# Patient Record
Sex: FEMALE | Race: WHITE | NOT HISPANIC OR LATINO | Employment: OTHER | URBAN - METROPOLITAN AREA
[De-identification: names, ages, dates, MRNs, and addresses within clinical notes are randomized per-mention and may not be internally consistent; named-entity substitution may affect disease eponyms.]

---

## 2024-02-03 ENCOUNTER — APPOINTMENT (OUTPATIENT)
Dept: RADIOLOGY | Facility: HOSPITAL | Age: 59
End: 2024-02-03
Payer: COMMERCIAL

## 2024-02-03 ENCOUNTER — HOSPITAL ENCOUNTER (OUTPATIENT)
Facility: HOSPITAL | Age: 59
Setting detail: OBSERVATION
Discharge: HOME | End: 2024-02-03
Attending: STUDENT IN AN ORGANIZED HEALTH CARE EDUCATION/TRAINING PROGRAM
Payer: COMMERCIAL

## 2024-02-03 ENCOUNTER — APPOINTMENT (OUTPATIENT)
Dept: CARDIOLOGY | Facility: HOSPITAL | Age: 59
End: 2024-02-03
Payer: COMMERCIAL

## 2024-02-03 VITALS
BODY MASS INDEX: 34.37 KG/M2 | SYSTOLIC BLOOD PRESSURE: 134 MMHG | RESPIRATION RATE: 12 BRPM | OXYGEN SATURATION: 89 % | HEART RATE: 90 BPM | TEMPERATURE: 97.2 F | HEIGHT: 66 IN | WEIGHT: 213.85 LBS | DIASTOLIC BLOOD PRESSURE: 81 MMHG

## 2024-02-03 DIAGNOSIS — R10.9 FLANK PAIN: Primary | ICD-10-CM

## 2024-02-03 DIAGNOSIS — N20.0 KIDNEY STONE: ICD-10-CM

## 2024-02-03 LAB
ALBUMIN SERPL BCP-MCNC: 4.4 G/DL (ref 3.4–5)
ALP SERPL-CCNC: 89 U/L (ref 33–136)
ALT SERPL W P-5'-P-CCNC: 28 U/L (ref 7–45)
ANION GAP SERPL CALC-SCNC: 17 MMOL/L (ref 10–20)
APPEARANCE UR: ABNORMAL
AST SERPL W P-5'-P-CCNC: 19 U/L (ref 9–39)
BASOPHILS # BLD AUTO: 0.05 X10*3/UL (ref 0–0.1)
BASOPHILS NFR BLD AUTO: 0.6 %
BILIRUB SERPL-MCNC: 0.4 MG/DL (ref 0–1.2)
BILIRUB UR STRIP.AUTO-MCNC: NEGATIVE MG/DL
BUN SERPL-MCNC: 16 MG/DL (ref 6–23)
CALCIUM SERPL-MCNC: 9.4 MG/DL (ref 8.6–10.3)
CHLORIDE SERPL-SCNC: 105 MMOL/L (ref 98–107)
CO2 SERPL-SCNC: 20 MMOL/L (ref 21–32)
COLOR UR: ABNORMAL
CREAT SERPL-MCNC: 0.69 MG/DL (ref 0.5–1.05)
EGFRCR SERPLBLD CKD-EPI 2021: >90 ML/MIN/1.73M*2
EOSINOPHIL # BLD AUTO: 0.16 X10*3/UL (ref 0–0.7)
EOSINOPHIL NFR BLD AUTO: 2.1 %
ERYTHROCYTE [DISTWIDTH] IN BLOOD BY AUTOMATED COUNT: 11.9 % (ref 11.5–14.5)
GLUCOSE SERPL-MCNC: 115 MG/DL (ref 74–99)
GLUCOSE UR STRIP.AUTO-MCNC: NEGATIVE MG/DL
HCT VFR BLD AUTO: 38.3 % (ref 36–46)
HGB BLD-MCNC: 13.3 G/DL (ref 12–16)
IMM GRANULOCYTES # BLD AUTO: 0.02 X10*3/UL (ref 0–0.7)
IMM GRANULOCYTES NFR BLD AUTO: 0.3 % (ref 0–0.9)
KETONES UR STRIP.AUTO-MCNC: NEGATIVE MG/DL
LEUKOCYTE ESTERASE UR QL STRIP.AUTO: ABNORMAL
LIPASE SERPL-CCNC: 44 U/L (ref 9–82)
LYMPHOCYTES # BLD AUTO: 2.87 X10*3/UL (ref 1.2–4.8)
LYMPHOCYTES NFR BLD AUTO: 37 %
MCH RBC QN AUTO: 31.7 PG (ref 26–34)
MCHC RBC AUTO-ENTMCNC: 34.7 G/DL (ref 32–36)
MCV RBC AUTO: 91 FL (ref 80–100)
MONOCYTES # BLD AUTO: 0.43 X10*3/UL (ref 0.1–1)
MONOCYTES NFR BLD AUTO: 5.5 %
MUCOUS THREADS #/AREA URNS AUTO: ABNORMAL /LPF
NEUTROPHILS # BLD AUTO: 4.23 X10*3/UL (ref 1.2–7.7)
NEUTROPHILS NFR BLD AUTO: 54.5 %
NITRITE UR QL STRIP.AUTO: NEGATIVE
NRBC BLD-RTO: 0 /100 WBCS (ref 0–0)
PH UR STRIP.AUTO: 7 [PH]
PLATELET # BLD AUTO: 293 X10*3/UL (ref 150–450)
POTASSIUM SERPL-SCNC: 3.6 MMOL/L (ref 3.5–5.3)
PROT SERPL-MCNC: 7.3 G/DL (ref 6.4–8.2)
PROT UR STRIP.AUTO-MCNC: NEGATIVE MG/DL
RBC # BLD AUTO: 4.2 X10*6/UL (ref 4–5.2)
RBC # UR STRIP.AUTO: ABNORMAL /UL
RBC #/AREA URNS AUTO: >20 /HPF
SODIUM SERPL-SCNC: 138 MMOL/L (ref 136–145)
SP GR UR STRIP.AUTO: 1.01
SQUAMOUS #/AREA URNS AUTO: ABNORMAL /HPF
UROBILINOGEN UR STRIP.AUTO-MCNC: <2 MG/DL
WBC # BLD AUTO: 7.8 X10*3/UL (ref 4.4–11.3)
WBC #/AREA URNS AUTO: ABNORMAL /HPF

## 2024-02-03 PROCEDURE — G0378 HOSPITAL OBSERVATION PER HR: HCPCS

## 2024-02-03 PROCEDURE — 83690 ASSAY OF LIPASE: CPT | Performed by: STUDENT IN AN ORGANIZED HEALTH CARE EDUCATION/TRAINING PROGRAM

## 2024-02-03 PROCEDURE — 99285 EMERGENCY DEPT VISIT HI MDM: CPT | Mod: 25 | Performed by: STUDENT IN AN ORGANIZED HEALTH CARE EDUCATION/TRAINING PROGRAM

## 2024-02-03 PROCEDURE — 87086 URINE CULTURE/COLONY COUNT: CPT

## 2024-02-03 PROCEDURE — 2550000001 HC RX 255 CONTRASTS: Performed by: STUDENT IN AN ORGANIZED HEALTH CARE EDUCATION/TRAINING PROGRAM

## 2024-02-03 PROCEDURE — 80053 COMPREHEN METABOLIC PANEL: CPT | Performed by: STUDENT IN AN ORGANIZED HEALTH CARE EDUCATION/TRAINING PROGRAM

## 2024-02-03 PROCEDURE — 96361 HYDRATE IV INFUSION ADD-ON: CPT | Performed by: STUDENT IN AN ORGANIZED HEALTH CARE EDUCATION/TRAINING PROGRAM

## 2024-02-03 PROCEDURE — 96375 TX/PRO/DX INJ NEW DRUG ADDON: CPT | Performed by: STUDENT IN AN ORGANIZED HEALTH CARE EDUCATION/TRAINING PROGRAM

## 2024-02-03 PROCEDURE — 2500000004 HC RX 250 GENERAL PHARMACY W/ HCPCS (ALT 636 FOR OP/ED)

## 2024-02-03 PROCEDURE — 99222 1ST HOSP IP/OBS MODERATE 55: CPT

## 2024-02-03 PROCEDURE — 36415 COLL VENOUS BLD VENIPUNCTURE: CPT | Performed by: STUDENT IN AN ORGANIZED HEALTH CARE EDUCATION/TRAINING PROGRAM

## 2024-02-03 PROCEDURE — 96365 THER/PROPH/DIAG IV INF INIT: CPT | Performed by: STUDENT IN AN ORGANIZED HEALTH CARE EDUCATION/TRAINING PROGRAM

## 2024-02-03 PROCEDURE — 81001 URINALYSIS AUTO W/SCOPE: CPT | Performed by: STUDENT IN AN ORGANIZED HEALTH CARE EDUCATION/TRAINING PROGRAM

## 2024-02-03 PROCEDURE — 74177 CT ABD & PELVIS W/CONTRAST: CPT | Performed by: RADIOLOGY

## 2024-02-03 PROCEDURE — 74018 RADEX ABDOMEN 1 VIEW: CPT

## 2024-02-03 PROCEDURE — 74177 CT ABD & PELVIS W/CONTRAST: CPT

## 2024-02-03 PROCEDURE — 93005 ELECTROCARDIOGRAM TRACING: CPT

## 2024-02-03 PROCEDURE — 85025 COMPLETE CBC W/AUTO DIFF WBC: CPT | Performed by: STUDENT IN AN ORGANIZED HEALTH CARE EDUCATION/TRAINING PROGRAM

## 2024-02-03 PROCEDURE — 2500000004 HC RX 250 GENERAL PHARMACY W/ HCPCS (ALT 636 FOR OP/ED): Performed by: STUDENT IN AN ORGANIZED HEALTH CARE EDUCATION/TRAINING PROGRAM

## 2024-02-03 RX ORDER — HEPARIN SODIUM 5000 [USP'U]/ML
5000 INJECTION, SOLUTION INTRAVENOUS; SUBCUTANEOUS EVERY 8 HOURS
Status: DISCONTINUED | OUTPATIENT
Start: 2024-02-03 | End: 2024-02-03 | Stop reason: HOSPADM

## 2024-02-03 RX ORDER — KETOROLAC TROMETHAMINE 30 MG/ML
15 INJECTION, SOLUTION INTRAMUSCULAR; INTRAVENOUS ONCE
Status: COMPLETED | OUTPATIENT
Start: 2024-02-03 | End: 2024-02-03

## 2024-02-03 RX ORDER — MORPHINE SULFATE 4 MG/ML
4 INJECTION, SOLUTION INTRAMUSCULAR; INTRAVENOUS ONCE
Status: DISCONTINUED | OUTPATIENT
Start: 2024-02-03 | End: 2024-02-03

## 2024-02-03 RX ORDER — ALBUTEROL SULFATE 90 UG/1
1-2 AEROSOL, METERED RESPIRATORY (INHALATION) EVERY 6 HOURS PRN
COMMUNITY
Start: 2023-11-29

## 2024-02-03 RX ORDER — ONDANSETRON HYDROCHLORIDE 2 MG/ML
4 INJECTION, SOLUTION INTRAVENOUS ONCE
Status: COMPLETED | OUTPATIENT
Start: 2024-02-03 | End: 2024-02-03

## 2024-02-03 RX ORDER — ONDANSETRON HYDROCHLORIDE 2 MG/ML
4 INJECTION, SOLUTION INTRAVENOUS EVERY 4 HOURS PRN
Status: DISCONTINUED | OUTPATIENT
Start: 2024-02-03 | End: 2024-02-03 | Stop reason: HOSPADM

## 2024-02-03 RX ORDER — AMLODIPINE BESYLATE 5 MG/1
5 TABLET ORAL DAILY
COMMUNITY
Start: 2023-11-02

## 2024-02-03 RX ORDER — BISMUTH SUBSALICYLATE 262 MG
1 TABLET,CHEWABLE ORAL DAILY
COMMUNITY

## 2024-02-03 RX ORDER — CEFTRIAXONE 1 G/50ML
1 INJECTION, SOLUTION INTRAVENOUS EVERY 24 HOURS
Status: DISCONTINUED | OUTPATIENT
Start: 2024-02-03 | End: 2024-02-03 | Stop reason: HOSPADM

## 2024-02-03 RX ORDER — SODIUM CHLORIDE, SODIUM LACTATE, POTASSIUM CHLORIDE, CALCIUM CHLORIDE 600; 310; 30; 20 MG/100ML; MG/100ML; MG/100ML; MG/100ML
100 INJECTION, SOLUTION INTRAVENOUS CONTINUOUS
Status: ACTIVE | OUTPATIENT
Start: 2024-02-03 | End: 2024-02-03

## 2024-02-03 RX ORDER — ACETAMINOPHEN 325 MG/1
650 TABLET ORAL EVERY 4 HOURS PRN
Status: DISCONTINUED | OUTPATIENT
Start: 2024-02-03 | End: 2024-02-03 | Stop reason: HOSPADM

## 2024-02-03 RX ORDER — CIPROFLOXACIN 500 MG/1
500 TABLET ORAL 2 TIMES DAILY
Qty: 4 TABLET | Refills: 0 | Status: SHIPPED | OUTPATIENT
Start: 2024-02-03 | End: 2024-02-05

## 2024-02-03 RX ORDER — HYDROXYCHLOROQUINE SULFATE 200 MG/1
200 TABLET, FILM COATED ORAL DAILY
COMMUNITY
Start: 2023-10-04

## 2024-02-03 RX ORDER — TAMSULOSIN HYDROCHLORIDE 0.4 MG/1
0.4 CAPSULE ORAL DAILY
Status: DISCONTINUED | OUTPATIENT
Start: 2024-02-03 | End: 2024-02-03 | Stop reason: HOSPADM

## 2024-02-03 RX ORDER — ABATACEPT 125 MG/ML
125 INJECTION, SOLUTION SUBCUTANEOUS
COMMUNITY
Start: 2017-01-16

## 2024-02-03 RX ADMIN — SODIUM CHLORIDE, POTASSIUM CHLORIDE, SODIUM LACTATE AND CALCIUM CHLORIDE 1000 ML: 600; 310; 30; 20 INJECTION, SOLUTION INTRAVENOUS at 03:37

## 2024-02-03 RX ADMIN — HYDROMORPHONE HYDROCHLORIDE 0.5 MG: 1 INJECTION, SOLUTION INTRAMUSCULAR; INTRAVENOUS; SUBCUTANEOUS at 04:31

## 2024-02-03 RX ADMIN — ONDANSETRON 4 MG: 2 INJECTION INTRAMUSCULAR; INTRAVENOUS at 03:37

## 2024-02-03 RX ADMIN — SODIUM CHLORIDE, POTASSIUM CHLORIDE, SODIUM LACTATE AND CALCIUM CHLORIDE 100 ML/HR: 600; 310; 30; 20 INJECTION, SOLUTION INTRAVENOUS at 08:00

## 2024-02-03 RX ADMIN — IOHEXOL 75 ML: 350 INJECTION, SOLUTION INTRAVENOUS at 04:43

## 2024-02-03 RX ADMIN — TAMSULOSIN HYDROCHLORIDE 0.4 MG: 0.4 CAPSULE ORAL at 08:02

## 2024-02-03 RX ADMIN — HEPARIN SODIUM 5000 UNITS: 5000 INJECTION INTRAVENOUS; SUBCUTANEOUS at 15:10

## 2024-02-03 RX ADMIN — HEPARIN SODIUM 5000 UNITS: 5000 INJECTION INTRAVENOUS; SUBCUTANEOUS at 08:03

## 2024-02-03 RX ADMIN — KETOROLAC TROMETHAMINE 15 MG: 30 INJECTION, SOLUTION INTRAMUSCULAR; INTRAVENOUS at 03:44

## 2024-02-03 RX ADMIN — CEFTRIAXONE SODIUM 1 G: 1 INJECTION, SOLUTION INTRAVENOUS at 08:01

## 2024-02-03 SDOH — SOCIAL STABILITY: SOCIAL INSECURITY: DO YOU FEEL ANYONE HAS EXPLOITED OR TAKEN ADVANTAGE OF YOU FINANCIALLY OR OF YOUR PERSONAL PROPERTY?: NO

## 2024-02-03 SDOH — SOCIAL STABILITY: SOCIAL INSECURITY: HAVE YOU HAD THOUGHTS OF HARMING ANYONE ELSE?: NO

## 2024-02-03 SDOH — SOCIAL STABILITY: SOCIAL INSECURITY: HAS ANYONE EVER THREATENED TO HURT YOUR FAMILY OR YOUR PETS?: NO

## 2024-02-03 SDOH — SOCIAL STABILITY: SOCIAL INSECURITY: ARE THERE ANY APPARENT SIGNS OF INJURIES/BEHAVIORS THAT COULD BE RELATED TO ABUSE/NEGLECT?: NO

## 2024-02-03 SDOH — SOCIAL STABILITY: SOCIAL INSECURITY: DO YOU FEEL UNSAFE GOING BACK TO THE PLACE WHERE YOU ARE LIVING?: NO

## 2024-02-03 SDOH — SOCIAL STABILITY: SOCIAL INSECURITY: DOES ANYONE TRY TO KEEP YOU FROM HAVING/CONTACTING OTHER FRIENDS OR DOING THINGS OUTSIDE YOUR HOME?: NO

## 2024-02-03 SDOH — SOCIAL STABILITY: SOCIAL INSECURITY: ABUSE: ADULT

## 2024-02-03 SDOH — SOCIAL STABILITY: SOCIAL INSECURITY: WERE YOU ABLE TO COMPLETE ALL THE BEHAVIORAL HEALTH SCREENINGS?: YES

## 2024-02-03 SDOH — SOCIAL STABILITY: SOCIAL INSECURITY: ARE YOU OR HAVE YOU BEEN THREATENED OR ABUSED PHYSICALLY, EMOTIONALLY, OR SEXUALLY BY ANYONE?: NO

## 2024-02-03 ASSESSMENT — PATIENT HEALTH QUESTIONNAIRE - PHQ9
2. FEELING DOWN, DEPRESSED OR HOPELESS: NOT AT ALL
1. LITTLE INTEREST OR PLEASURE IN DOING THINGS: NOT AT ALL
SUM OF ALL RESPONSES TO PHQ9 QUESTIONS 1 & 2: 0

## 2024-02-03 ASSESSMENT — COLUMBIA-SUICIDE SEVERITY RATING SCALE - C-SSRS
6. HAVE YOU EVER DONE ANYTHING, STARTED TO DO ANYTHING, OR PREPARED TO DO ANYTHING TO END YOUR LIFE?: NO
1. IN THE PAST MONTH, HAVE YOU WISHED YOU WERE DEAD OR WISHED YOU COULD GO TO SLEEP AND NOT WAKE UP?: NO
2. HAVE YOU ACTUALLY HAD ANY THOUGHTS OF KILLING YOURSELF?: NO

## 2024-02-03 ASSESSMENT — ACTIVITIES OF DAILY LIVING (ADL)
TOILETING: INDEPENDENT
WALKS IN HOME: INDEPENDENT
GROOMING: INDEPENDENT
HEARING - LEFT EAR: FUNCTIONAL
BATHING: INDEPENDENT
LACK_OF_TRANSPORTATION: NO
HEARING - RIGHT EAR: FUNCTIONAL
JUDGMENT_ADEQUATE_SAFELY_COMPLETE_DAILY_ACTIVITIES: YES
PATIENT'S MEMORY ADEQUATE TO SAFELY COMPLETE DAILY ACTIVITIES?: YES
ADEQUATE_TO_COMPLETE_ADL: YES
DRESSING YOURSELF: INDEPENDENT
FEEDING YOURSELF: INDEPENDENT

## 2024-02-03 ASSESSMENT — LIFESTYLE VARIABLES
EVER FELT BAD OR GUILTY ABOUT YOUR DRINKING: NO
HOW MANY STANDARD DRINKS CONTAINING ALCOHOL DO YOU HAVE ON A TYPICAL DAY: 1 OR 2
SKIP TO QUESTIONS 9-10: 1
AUDIT-C TOTAL SCORE: 4
HOW OFTEN DO YOU HAVE 6 OR MORE DRINKS ON ONE OCCASION: NEVER
HAVE PEOPLE ANNOYED YOU BY CRITICIZING YOUR DRINKING: NO
AUDIT-C TOTAL SCORE: 4
HOW OFTEN DO YOU HAVE A DRINK CONTAINING ALCOHOL: 4 OR MORE TIMES A WEEK
EVER HAD A DRINK FIRST THING IN THE MORNING TO STEADY YOUR NERVES TO GET RID OF A HANGOVER: NO
HAVE YOU EVER FELT YOU SHOULD CUT DOWN ON YOUR DRINKING: NO

## 2024-02-03 ASSESSMENT — COGNITIVE AND FUNCTIONAL STATUS - GENERAL
PATIENT BASELINE BEDBOUND: NO
MOBILITY SCORE: 24
DAILY ACTIVITIY SCORE: 24

## 2024-02-03 ASSESSMENT — PAIN SCALES - GENERAL
PAINLEVEL_OUTOF10: 0 - NO PAIN
PAINLEVEL_OUTOF10: 4
PAINLEVEL_OUTOF10: 8

## 2024-02-03 ASSESSMENT — PAIN - FUNCTIONAL ASSESSMENT
PAIN_FUNCTIONAL_ASSESSMENT: 0-10
PAIN_FUNCTIONAL_ASSESSMENT: 0-10

## 2024-02-03 ASSESSMENT — PAIN DESCRIPTION - LOCATION
LOCATION: OTHER (COMMENT)
LOCATION: BACK

## 2024-02-03 ASSESSMENT — PAIN DESCRIPTION - ORIENTATION: ORIENTATION: RIGHT

## 2024-02-03 NOTE — PROGRESS NOTES
Attending admit note/H&P admission orders reviewed and amended as needed patient seen and evaluated in ER by ER physician and nurse practitioner also urology seen patient    Chief complaint flank pain    History of present illness  Donna Nair is a 58 y.o. female on day 0 of admission presenting with Kidney stone.      Subjective    Patient feeling better less pain less pain meds few hours ago patient opted nonsurgical management at this point continuing with IV fluids antibiotics and supportive care will follow-up x-ray    Objective     Last Recorded Vitals  /79   Pulse 74   Temp 36 °C (96.8 °F)   Resp 12   Wt 97 kg (213 lb 13.5 oz)   SpO2 92%   Intake/Output last 3 Shifts:    Intake/Output Summary (Last 24 hours) at 2/3/2024 1140  Last data filed at 2/3/2024 1126  Gross per 24 hour   Intake 393.33 ml   Output --   Net 393.33 ml       Admission Weight  Weight: 90.7 kg (200 lb) (02/03/24 0310)    Daily Weight  02/03/24 : 97 kg (213 lb 13.5 oz)    Image Results  CT abdomen pelvis w IV contrast  Narrative: Interpreted By:  Tania Sutherland,   STUDY:  CT ABDOMEN PELVIS W IV CONTRAST;  2/3/2024 4:42 am      INDICATION:  Signs/Symptoms:right groin pain.      COMPARISON:  CT abdomen and pelvis 10/05/2022      ACCESSION NUMBER(S):  [id number]      ORDERING CLINICIAN:  ELFEGO GOMES      TECHNIQUE:  Axial CT of the abdomen and pelvis was performed. Coronal and  sagittal reconstructions were performed.      Intravenous contrast material was administered without immediate  complication.      FINDINGS:  LOWER CHEST:  No consolidation or pleural effusion.  Surgical clips are noted at the left breast.      ABDOMEN:      LIVER:  The liver is enlarged measuring 20.4 cm in craniocaudal dimension and  demonstrates diffusely decreased attenuation suggesting steatosis.      BILE DUCTS:  No significant dilation.      GALLBLADDER:  There is cholelithiasis.      PANCREAS:  No peripancreatic inflammatory changes.       SPLEEN:  Unremarkable.      ADRENAL GLANDS:  Unremarkable.      KIDNEYS AND URETERS:  There is decreased enhancement of the right kidney. There is moderate  right hydronephrosis and dilation of the right ureter. There is a 5  mm calculus at the distal right ureter in the pelvis. No left  hydronephrosis or hydroureter. There are bilateral renal calculi  measuring up to 5 mm. Subcentimeter hypodensities at bilateral  kidneys are too small to be adequately characterized.      PELVIS:      BLADDER:  The urinary bladder is decompressed. There is mild soft tissue  stranding at the urinary bladder.      REPRODUCTIVE ORGANS:  The uterus is present.      BOWEL:  No bowel obstruction. There is colonic diverticulosis with no CT  evidence for acute diverticulitis. There is submucosal fat deposition  at the terminal ileum, ascending colon and transverse colon. The  appendix is surgically absent.      VESSELS:  Mild atherosclerotic calcifications are noted at the abdominal aorta  and its branches. No abdominal aortic aneurysm.      PERITONEUM/RETROPERITONEUM/LYMPH NODES:  No free fluid or free air.  No retroperitoneal hemorrhage. No  pathologically enlarged lymph nodes are identified.      BONES AND ABDOMINAL WALL:  Multilevel degenerative changes are noted in the spine.  There is a small fat containing umbilical hernia.  There is a small fat containing right inguinal hernia.      Impression: 1.  Right-sided obstructive uropathy with moderate right  hydronephrosis and dilation of the right ureter and with a 5 mm  calculus at the distal right ureter .  2. Decreased enhancement of the right kidney, concerning for  compromised renal function.  3. Bilateral nephrolithiasis.  4. Mild soft tissue stranding at the urinary bladder. Please  correlate with urinalysis to exclude superimposed cystitis.  5. Colonic diverticulosis.  6.  Submucosal fat at the terminal ileum and at the ascending colon  and transverse colon, nonspecific, may  represent normal variant  versus chronic changes of inflammatory bowel disease.  7. Hepatomegaly. Fatty infiltration of the liver.  8. Cholelithiasis.          MACRO:  None.      Signed by: Tania Sutherland 2/3/2024 5:08 AM  Dictation workstation:   [id number]  History of present illness    Donna Nair is a 58 y.o. female with past medical history of kidney stones, asthma, rheumatoid arthritis, breast cancer, who presented to [facility] ED today with right flank pain that started today.  Patient states this feels very typical of her previous kidney stone episodes that required lithotripsy.  She does note that she has known kidney stones and does follow with urology.  She tried taking NSAIDs at home with little relief therefore she is presenting to the emergency department for further evaluation.  She states when the pain initially started she was having occasional coughing fits, the coughing has resolved and she noted that the pain never resolved. States she had nausea and an episode of vomiting. Denies dysuria, but has frequency. Has history of abdominal surgeries including appendectomy.  Describes the pain as coming in waves.  Denies any urinary symptoms, fevers, or chills.  Denies chest pain, shortness of breath, diarrhea, or constipation.       ED course: Hemodynamically stable. Afebrile. /90, HR 81, RR 20, 98% RA. EKG unavailable for my review. Labs overall unremarkable. Glucose 115. Bicarb 20. Lipase 44. Urine culture collected.  See imaging results below. Dilaudid, toradol, Zofran, and IVF given in ED.      Past Medical History  Medical History        Past Medical History:   Diagnosis Date    Other conditions influencing health status       Bone Density Studies Dual-Energy X-ray Absorptiometry            Surgical History  Surgical History         Past Surgical History:   Procedure Laterality Date    BREAST LUMPECTOMY   08/08/2016     Left Breast Lumpectomy    COLONOSCOPY   05/02/2013     Complete  "Colonoscopy            Social History  She has no history on file for tobacco use, alcohol use, and drug use.     Family History  Family History   No family history on file.        Allergies  Penicillins     Review of Systems   10 point review of symptoms was performed and is negative except for what is stated in the HPI above.  Physical Exam  Constitutional:       Appearance: Normal appearance. She is obese.   HENT:      Head: Normocephalic and atraumatic.      Nose: Nose normal.      Mouth/Throat:      Mouth: Mucous membranes are moist.      Pharynx: Oropharynx is clear.   Eyes:      Extraocular Movements: Extraocular movements intact.      Conjunctiva/sclera: Conjunctivae normal.      Pupils: Pupils are equal, round, and reactive to light.   Cardiovascular:      Rate and Rhythm: Normal rate and regular rhythm.      Pulses: Normal pulses.      Heart sounds: Normal heart sounds.   Pulmonary:      Effort: Pulmonary effort is normal.      Breath sounds: Normal breath sounds.   Abdominal:      General: Abdomen is flat. Bowel sounds are normal.      Palpations: Abdomen is soft.   Musculoskeletal:         General: Normal range of motion.      Cervical back: Normal range of motion and neck supple.   Skin:     General: Skin is warm and dry.      Capillary Refill: Capillary refill takes less than 2 seconds.   Neurological:      General: No focal deficit present.      Mental Status: She is alert and oriented to person, place, and time.   Psychiatric:         Mood and Affect: Mood normal.         Behavior: Behavior normal.         Thought Content: Thought content normal.         Judgment: Judgment normal.            Last Recorded Vitals  Blood pressure (!) 198/90, pulse 81, temperature 36.5 °C (97.7 °F), resp. rate 20, height 1.7 m (5' 6.93\"), weight 90.7 kg (200 lb), SpO2 98 %.     Relevant Results        Results for orders placed or performed during the hospital encounter of 02/03/24 (from the past 24 hour(s))   CBC and " Auto Differential   Result Value Ref Range     WBC 7.8 4.4 - 11.3 x10*3/uL     nRBC 0.0 0.0 - 0.0 /100 WBCs     RBC 4.20 4.00 - 5.20 x10*6/uL     Hemoglobin 13.3 12.0 - 16.0 g/dL     Hematocrit 38.3 36.0 - 46.0 %     MCV 91 80 - 100 fL     MCH 31.7 26.0 - 34.0 pg     MCHC 34.7 32.0 - 36.0 g/dL     RDW 11.9 11.5 - 14.5 %     Platelets 293 150 - 450 x10*3/uL     Neutrophils % 54.5 40.0 - 80.0 %     Immature Granulocytes %, Automated 0.3 0.0 - 0.9 %     Lymphocytes % 37.0 13.0 - 44.0 %     Monocytes % 5.5 2.0 - 10.0 %     Eosinophils % 2.1 0.0 - 6.0 %     Basophils % 0.6 0.0 - 2.0 %     Neutrophils Absolute 4.23 1.20 - 7.70 x10*3/uL     Immature Granulocytes Absolute, Automated 0.02 0.00 - 0.70 x10*3/uL     Lymphocytes Absolute 2.87 1.20 - 4.80 x10*3/uL     Monocytes Absolute 0.43 0.10 - 1.00 x10*3/uL     Eosinophils Absolute 0.16 0.00 - 0.70 x10*3/uL     Basophils Absolute 0.05 0.00 - 0.10 x10*3/uL   Comprehensive metabolic panel   Result Value Ref Range     Glucose 115 (H) 74 - 99 mg/dL     Sodium 138 136 - 145 mmol/L     Potassium 3.6 3.5 - 5.3 mmol/L     Chloride 105 98 - 107 mmol/L     Bicarbonate 20 (L) 21 - 32 mmol/L     Anion Gap 17 10 - 20 mmol/L     Urea Nitrogen 16 6 - 23 mg/dL     Creatinine 0.69 0.50 - 1.05 mg/dL     eGFR >90 >60 mL/min/1.73m*2     Calcium 9.4 8.6 - 10.3 mg/dL     Albumin 4.4 3.4 - 5.0 g/dL     Alkaline Phosphatase 89 33 - 136 U/L     Total Protein 7.3 6.4 - 8.2 g/dL     AST 19 9 - 39 U/L     Bilirubin, Total 0.4 0.0 - 1.2 mg/dL     ALT 28 7 - 45 U/L   Lipase   Result Value Ref Range     Lipase 44 9 - 82 U/L   Urinalysis with Reflex Microscopic   Result Value Ref Range     Color, Urine Straw Straw, Yellow     Appearance, Urine Hazy (N) Clear     Specific Gravity, Urine 1.014 1.005 - 1.035     pH, Urine 7.0 5.0, 5.5, 6.0, 6.5, 7.0, 7.5, 8.0     Protein, Urine NEGATIVE NEGATIVE mg/dL     Glucose, Urine NEGATIVE NEGATIVE mg/dL     Blood, Urine LARGE (3+) (A) NEGATIVE     Ketones, Urine  NEGATIVE NEGATIVE mg/dL     Bilirubin, Urine NEGATIVE NEGATIVE     Urobilinogen, Urine <2.0 <2.0 mg/dL     Nitrite, Urine NEGATIVE NEGATIVE     Leukocyte Esterase, Urine SMALL (1+) (A) NEGATIVE   Microscopic Only, Urine   Result Value Ref Range     WBC, Urine 1-5 1-5, NONE /HPF     RBC, Urine >20 (A) NONE, 1-2, 3-5 /HPF     Squamous Epithelial Cells, Urine 1-9 (SPARSE) Reference range not established. /HPF     Mucus, Urine 1+ Reference range not established. /LPF      CT abdomen pelvis w IV contrast     Result Date: 2/3/2024  Interpreted By:  Tania Sutherland, STUDY: CT ABDOMEN PELVIS W IV CONTRAST;  2/3/2024 4:42 am   INDICATION: Signs/Symptoms:right groin pain.   COMPARISON: CT abdomen and pelvis 10/05/2022   ACCESSION NUMBER(S): [id number]   ORDERING CLINICIAN: ELFEGO GOMES   TECHNIQUE: Axial CT of the abdomen and pelvis was performed. Coronal and sagittal reconstructions were performed.   Intravenous contrast material was administered without immediate complication.   FINDINGS: LOWER CHEST: No consolidation or pleural effusion. Surgical clips are noted at the left breast.   ABDOMEN:   LIVER: The liver is enlarged measuring 20.4 cm in craniocaudal dimension and demonstrates diffusely decreased attenuation suggesting steatosis.   BILE DUCTS: No significant dilation.   GALLBLADDER: There is cholelithiasis.   PANCREAS: No peripancreatic inflammatory changes.   SPLEEN: Unremarkable.   ADRENAL GLANDS: Unremarkable.   KIDNEYS AND URETERS: There is decreased enhancement of the right kidney. There is moderate right hydronephrosis and dilation of the right ureter. There is a 5 mm calculus at the distal right ureter in the pelvis. No left hydronephrosis or hydroureter. There are bilateral renal calculi measuring up to 5 mm. Subcentimeter hypodensities at bilateral kidneys are too small to be adequately characterized.   PELVIS:   BLADDER: The urinary bladder is decompressed. There is mild soft tissue stranding at the  urinary bladder.   REPRODUCTIVE ORGANS: The uterus is present.   BOWEL: No bowel obstruction. There is colonic diverticulosis with no CT evidence for acute diverticulitis. There is submucosal fat deposition at the terminal ileum, ascending colon and transverse colon. The appendix is surgically absent.   VESSELS: Mild atherosclerotic calcifications are noted at the abdominal aorta and its branches. No abdominal aortic aneurysm.   PERITONEUM/RETROPERITONEUM/LYMPH NODES: No free fluid or free air.  No retroperitoneal hemorrhage. No pathologically enlarged lymph nodes are identified.   BONES AND ABDOMINAL WALL: Multilevel degenerative changes are noted in the spine. There is a small fat containing umbilical hernia. There is a small fat containing right inguinal hernia.        1.  Right-sided obstructive uropathy with moderate right hydronephrosis and dilation of the right ureter and with a 5 mm calculus at the distal right ureter . 2. Decreased enhancement of the right kidney, concerning for compromised renal function. 3. Bilateral nephrolithiasis. 4. Mild soft tissue stranding at the urinary bladder. Please correlate with urinalysis to exclude superimposed cystitis. 5. Colonic diverticulosis. 6.  Submucosal fat at the terminal ileum and at the ascending colon and transverse colon, nonspecific, may represent normal variant versus chronic changes of inflammatory bowel disease. 7. Hepatomegaly. Fatty infiltration of the liver. 8. Cholelithiasis.     MACRO: None.   Signed by: Tania Sutherland 2/3/2024 5:08 AM Dictation workstation:   [id number]         Assessment/Plan       Principal Problem:    Kidney stone       58 year old female with past medical history of kidney stones, asthma, rheumatoid arthritis, breast cancer, who presented to [facility] ED today with right flank pain that started today.  Patient states this feels very typical of her previous kidney stone episodes that required lithotripsy.  She does note that she  has known kidney stones and does follow with urology.  She tried taking NSAIDs at home with little relief therefore she is presenting to the emergency department for further evaluation. Urology consulted. Start antibiotics for suspected UTI. Patient will be hospitalized for further medical management.  Feels better feels the questionable passing of the stone she opted nonsurgical management at this point we will follow-up x-ray     #5mm obstructive kidney stone in right ureter with hydronephrosis  #Suspect UTI  #Right flank pain    Urology consult and appreciate recs  Imaging results above  Zofran/pain medication prn   Ceftriaxone  Flomax   1L LR given in ED. Continue LR @ 100ml/hr x 1 liter (monitor for fluid overload)  NPO.  After midnight  Follow urine culture  Repeat labs & BNP in AM     Chronic Issues  #Asthma  #Rheumatoid arthritis  #Breast cancer  Continue home meds as appropriate when nursing completes home med rec.  Full code     #DVT PPX  Heparin SQ. Hold morning dose for potential procedure.  SCD's               Ritesh Boyle MD

## 2024-02-03 NOTE — DISCHARGE SUMMARY
Discharge Diagnosis  Kidney stone    Issues Requiring Follow-Up  Urology and pcp    Discharge Meds     Your medication list        ASK your doctor about these medications        Instructions Last Dose Given Next Dose Due   albuterol 90 mcg/actuation inhaler           amLODIPine 5 mg tablet  Commonly known as: Norvasc           hydroxychloroquine 200 mg tablet  Commonly known as: Plaquenil           multivitamin tablet           Orencia ClickJect 125 mg/mL auto-injector auto-injection  Generic drug: abatacept                    Test Results Pending At Discharge  Pending Labs       Order Current Status    Urine culture In process            Hospital Course  Donna Nair is a 58 y.o. female on day 0 of admission presenting with Kidney stone.           Subjective      Patient feeling better less pain less pain meds few hours ago patient opted nonsurgical management at this point continuing with IV fluids antibiotics and supportive care will follow-up x-ray        Objective []Expand by Default  Last Recorded Vitals  /79   Pulse 74   Temp 36 °C (96.8 °F)   Resp 12   Wt 97 kg (213 lb 13.5 oz)   SpO2 92%   Intake/Output last 3 Shifts:     Intake/Output Summary (Last 24 hours) at 2/3/2024 1140  Last data filed at 2/3/2024 1126      Gross per 24 hour   Intake 393.33 ml   Output --   Net 393.33 ml         Admission Weight  Weight: 90.7 kg (200 lb) (02/03/24 0310)     Daily Weight  02/03/24 : 97 kg (213 lb 13.5 oz)     Image Results  CT abdomen pelvis w IV contrast  Narrative: Interpreted By:  Tania Sutherland,   STUDY:  CT ABDOMEN PELVIS W IV CONTRAST;  2/3/2024 4:42 am      INDICATION:  Signs/Symptoms:right groin pain.      COMPARISON:  CT abdomen and pelvis 10/05/2022      ACCESSION NUMBER(S):  [id number]      ORDERING CLINICIAN:  ELFEGO GOMES      TECHNIQUE:  Axial CT of the abdomen and pelvis was performed. Coronal and  sagittal reconstructions were performed.      Intravenous contrast material was  administered without immediate  complication.      FINDINGS:  LOWER CHEST:  No consolidation or pleural effusion.  Surgical clips are noted at the left breast.      ABDOMEN:      LIVER:  The liver is enlarged measuring 20.4 cm in craniocaudal dimension and  demonstrates diffusely decreased attenuation suggesting steatosis.      BILE DUCTS:  No significant dilation.      GALLBLADDER:  There is cholelithiasis.      PANCREAS:  No peripancreatic inflammatory changes.      SPLEEN:  Unremarkable.      ADRENAL GLANDS:  Unremarkable.      KIDNEYS AND URETERS:  There is decreased enhancement of the right kidney. There is moderate  right hydronephrosis and dilation of the right ureter. There is a 5  mm calculus at the distal right ureter in the pelvis. No left  hydronephrosis or hydroureter. There are bilateral renal calculi  measuring up to 5 mm. Subcentimeter hypodensities at bilateral  kidneys are too small to be adequately characterized.      PELVIS:      BLADDER:  The urinary bladder is decompressed. There is mild soft tissue  stranding at the urinary bladder.      REPRODUCTIVE ORGANS:  The uterus is present.      BOWEL:  No bowel obstruction. There is colonic diverticulosis with no CT  evidence for acute diverticulitis. There is submucosal fat deposition  at the terminal ileum, ascending colon and transverse colon. The  appendix is surgically absent.      VESSELS:  Mild atherosclerotic calcifications are noted at the abdominal aorta  and its branches. No abdominal aortic aneurysm.      PERITONEUM/RETROPERITONEUM/LYMPH NODES:  No free fluid or free air.  No retroperitoneal hemorrhage. No  pathologically enlarged lymph nodes are identified.      BONES AND ABDOMINAL WALL:  Multilevel degenerative changes are noted in the spine.  There is a small fat containing umbilical hernia.  There is a small fat containing right inguinal hernia.      Impression: 1.  Right-sided obstructive uropathy with moderate right  hydronephrosis  and dilation of the right ureter and with a 5 mm  calculus at the distal right ureter .  2. Decreased enhancement of the right kidney, concerning for  compromised renal function.  3. Bilateral nephrolithiasis.  4. Mild soft tissue stranding at the urinary bladder. Please  correlate with urinalysis to exclude superimposed cystitis.  5. Colonic diverticulosis.  6.  Submucosal fat at the terminal ileum and at the ascending colon  and transverse colon, nonspecific, may represent normal variant  versus chronic changes of inflammatory bowel disease.  7. Hepatomegaly. Fatty infiltration of the liver.  8. Cholelithiasis.          MACRO:  None.      Signed by: Tania Suthelrand 2/3/2024 5:08 AM  Dictation workstation:   [id number]  History of present illness     Donna Nair is a 58 y.o. female with past medical history of kidney stones, asthma, rheumatoid arthritis, breast cancer, who presented to [facility] ED today with right flank pain that started today.  Patient states this feels very typical of her previous kidney stone episodes that required lithotripsy.  She does note that she has known kidney stones and does follow with urology.  She tried taking NSAIDs at home with little relief therefore she is presenting to the emergency department for further evaluation.  She states when the pain initially started she was having occasional coughing fits, the coughing has resolved and she noted that the pain never resolved. States she had nausea and an episode of vomiting. Denies dysuria, but has frequency. Has history of abdominal surgeries including appendectomy.  Describes the pain as coming in waves.  Denies any urinary symptoms, fevers, or chills.  Denies chest pain, shortness of breath, diarrhea, or constipation.       ED course: Hemodynamically stable. Afebrile. /90, HR 81, RR 20, 98% RA. EKG unavailable for my review. Labs overall unremarkable. Glucose 115. Bicarb 20. Lipase 44. Urine culture collected.  See imaging  results below. Dilaudid, toradol, Zofran, and IVF given in ED.      Past Medical History  Medical History           Past Medical History:   Diagnosis Date    Other conditions influencing health status       Bone Density Studies Dual-Energy X-ray Absorptiometry            Surgical History  Surgical History             Past Surgical History:   Procedure Laterality Date    BREAST LUMPECTOMY   08/08/2016     Left Breast Lumpectomy    COLONOSCOPY   05/02/2013     Complete Colonoscopy            Social History  She has no history on file for tobacco use, alcohol use, and drug use.     Family History  Family History   No family history on file.         Allergies  Penicillins     Review of Systems   10 point review of symptoms was performed and is negative except for what is stated in the HPI above.  Physical Exam  Constitutional:       Appearance: Normal appearance.    HENT:      Head: Normocephalic and atraumatic.      Nose: Nose normal.      Mouth/Throat:      Mouth: Mucous membranes are moist.      Pharynx: Oropharynx is clear.   Eyes:      Extraocular Movements: Extraocular movements intact.      Conjunctiva/sclera: Conjunctivae normal.      Pupils: Pupils are equal, round, and reactive to light.   Cardiovascular:      Rate and Rhythm: Normal rate and regular rhythm.      Pulses: Normal pulses.      Heart sounds: Normal heart sounds.   Pulmonary:      Effort: Pulmonary effort is normal.      Breath sounds: Normal breath sounds.   Abdominal:      General: Abdomen is flat. Bowel sounds are normal.      Palpations: Abdomen is soft.   Musculoskeletal:         General: Normal range of motion.      Cervical back: Normal range of motion and neck supple.   Skin:     General: Skin is warm and dry.      Capillary Refill: Capillary refill takes less than 2 seconds.   Neurological:      General: No focal deficit present.      Mental Status: She is alert and oriented to person, place, and time.   Psychiatric:         Mood and  Affect: Mood normal.         Behavior: Behavior normal.         Thought Content: Thought content normal.         Judgment: Judgment normal.            Relevant Results            Results for orders placed or performed during the hospital encounter of 02/03/24 (from the past 24 hour(s))   CBC and Auto Differential   Result Value Ref Range     WBC 7.8 4.4 - 11.3 x10*3/uL     nRBC 0.0 0.0 - 0.0 /100 WBCs     RBC 4.20 4.00 - 5.20 x10*6/uL     Hemoglobin 13.3 12.0 - 16.0 g/dL     Hematocrit 38.3 36.0 - 46.0 %     MCV 91 80 - 100 fL     MCH 31.7 26.0 - 34.0 pg     MCHC 34.7 32.0 - 36.0 g/dL     RDW 11.9 11.5 - 14.5 %     Platelets 293 150 - 450 x10*3/uL     Neutrophils % 54.5 40.0 - 80.0 %     Immature Granulocytes %, Automated 0.3 0.0 - 0.9 %     Lymphocytes % 37.0 13.0 - 44.0 %     Monocytes % 5.5 2.0 - 10.0 %     Eosinophils % 2.1 0.0 - 6.0 %     Basophils % 0.6 0.0 - 2.0 %     Neutrophils Absolute 4.23 1.20 - 7.70 x10*3/uL     Immature Granulocytes Absolute, Automated 0.02 0.00 - 0.70 x10*3/uL     Lymphocytes Absolute 2.87 1.20 - 4.80 x10*3/uL     Monocytes Absolute 0.43 0.10 - 1.00 x10*3/uL     Eosinophils Absolute 0.16 0.00 - 0.70 x10*3/uL     Basophils Absolute 0.05 0.00 - 0.10 x10*3/uL   Comprehensive metabolic panel   Result Value Ref Range     Glucose 115 (H) 74 - 99 mg/dL     Sodium 138 136 - 145 mmol/L     Potassium 3.6 3.5 - 5.3 mmol/L     Chloride 105 98 - 107 mmol/L     Bicarbonate 20 (L) 21 - 32 mmol/L     Anion Gap 17 10 - 20 mmol/L     Urea Nitrogen 16 6 - 23 mg/dL     Creatinine 0.69 0.50 - 1.05 mg/dL     eGFR >90 >60 mL/min/1.73m*2     Calcium 9.4 8.6 - 10.3 mg/dL     Albumin 4.4 3.4 - 5.0 g/dL     Alkaline Phosphatase 89 33 - 136 U/L     Total Protein 7.3 6.4 - 8.2 g/dL     AST 19 9 - 39 U/L     Bilirubin, Total 0.4 0.0 - 1.2 mg/dL     ALT 28 7 - 45 U/L   Lipase   Result Value Ref Range     Lipase 44 9 - 82 U/L   Urinalysis with Reflex Microscopic   Result Value Ref Range     Color, Urine Straw Straw,  Yellow     Appearance, Urine Hazy (N) Clear     Specific Gravity, Urine 1.014 1.005 - 1.035     pH, Urine 7.0 5.0, 5.5, 6.0, 6.5, 7.0, 7.5, 8.0     Protein, Urine NEGATIVE NEGATIVE mg/dL     Glucose, Urine NEGATIVE NEGATIVE mg/dL     Blood, Urine LARGE (3+) (A) NEGATIVE     Ketones, Urine NEGATIVE NEGATIVE mg/dL     Bilirubin, Urine NEGATIVE NEGATIVE     Urobilinogen, Urine <2.0 <2.0 mg/dL     Nitrite, Urine NEGATIVE NEGATIVE     Leukocyte Esterase, Urine SMALL (1+) (A) NEGATIVE   Microscopic Only, Urine   Result Value Ref Range     WBC, Urine 1-5 1-5, NONE /HPF     RBC, Urine >20 (A) NONE, 1-2, 3-5 /HPF     Squamous Epithelial Cells, Urine 1-9 (SPARSE) Reference range not established. /HPF     Mucus, Urine 1+ Reference range not established. /LPF      CT abdomen pelvis w IV contrast     Result Date: 2/3/2024  Interpreted By:  Tania Sutherland, STUDY: CT ABDOMEN PELVIS W IV CONTRAST;  2/3/2024 4:42 am   INDICATION: Signs/Symptoms:right groin pain.   COMPARISON: CT abdomen and pelvis 10/05/2022   ACCESSION NUMBER(S): [id number]   ORDERING CLINICIAN: ELFEGO GOMES   TECHNIQUE: Axial CT of the abdomen and pelvis was performed. Coronal and sagittal reconstructions were performed.   Intravenous contrast material was administered without immediate complication.   FINDINGS: LOWER CHEST: No consolidation or pleural effusion. Surgical clips are noted at the left breast.   ABDOMEN:   LIVER: The liver is enlarged measuring 20.4 cm in craniocaudal dimension and demonstrates diffusely decreased attenuation suggesting steatosis.   BILE DUCTS: No significant dilation.   GALLBLADDER: There is cholelithiasis.   PANCREAS: No peripancreatic inflammatory changes.   SPLEEN: Unremarkable.   ADRENAL GLANDS: Unremarkable.   KIDNEYS AND URETERS: There is decreased enhancement of the right kidney. There is moderate right hydronephrosis and dilation of the right ureter. There is a 5 mm calculus at the distal right ureter in the pelvis.  No left hydronephrosis or hydroureter. There are bilateral renal calculi measuring up to 5 mm. Subcentimeter hypodensities at bilateral kidneys are too small to be adequately characterized.   PELVIS:   BLADDER: The urinary bladder is decompressed. There is mild soft tissue stranding at the urinary bladder.   REPRODUCTIVE ORGANS: The uterus is present.   BOWEL: No bowel obstruction. There is colonic diverticulosis with no CT evidence for acute diverticulitis. There is submucosal fat deposition at the terminal ileum, ascending colon and transverse colon. The appendix is surgically absent.   VESSELS: Mild atherosclerotic calcifications are noted at the abdominal aorta and its branches. No abdominal aortic aneurysm.   PERITONEUM/RETROPERITONEUM/LYMPH NODES: No free fluid or free air.  No retroperitoneal hemorrhage. No pathologically enlarged lymph nodes are identified.   BONES AND ABDOMINAL WALL: Multilevel degenerative changes are noted in the spine. There is a small fat containing umbilical hernia. There is a small fat containing right inguinal hernia.        1.  Right-sided obstructive uropathy with moderate right hydronephrosis and dilation of the right ureter and with a 5 mm calculus at the distal right ureter . 2. Decreased enhancement of the right kidney, concerning for compromised renal function. 3. Bilateral nephrolithiasis. 4. Mild soft tissue stranding at the urinary bladder. Please correlate with urinalysis to exclude superimposed cystitis. 5. Colonic diverticulosis. 6.  Submucosal fat at the terminal ileum and at the ascending colon and transverse colon, nonspecific, may represent normal variant versus chronic changes of inflammatory bowel disease. 7. Hepatomegaly. Fatty infiltration of the liver. 8. Cholelithiasis.     MACRO: None.   Signed by: Tania Sutherland 2/3/2024 5:08 AM Dictation workstation:   [id number]         Assessment/Plan         Principal Problem:    Kidney stone Urology consult and  appreciate recs  Imaging results above  Zofran/pain medication prn   Ceftriaxone  Flomax   1L LR given in ED. Continue LR @ 100ml/hr x 1 liter (monitor for fluid overload)        58 year old female with past medical history of kidney stones, asthma, rheumatoid arthritis, breast cancer, who presented to [facility] ED today with right flank pain that started today.  Patient states this feels very typical of her previous kidney stone episodes that required lithotripsy.  She does note that she has known kidney stones and does follow with urology.  She tried taking NSAIDs at home with little relief therefore she is presenting to the emergency department for further evaluation. Urology consulted. Start antibiotics for suspected UTI. Patient will be hospitalized for further medical management.  Feels better feels the questionable passing of the stone she opted nonsurgical management at this point we will follow-up x-ray     #5mm obstructive kidney stone in right ureter with hydronephrosis  Resolved by kub ; pain resolved d/w follow up urology and pcp                    Chronic Issues  #Asthma  #Rheumatoid arthritis  #Breast cancer       See x 2 on this day of dc / admit     Ritesh Boyle MD

## 2024-02-03 NOTE — CONSULTS
"Consults    Reason For Consult  Ureteral stone    History Of Present Illness  Donna Nair is a 58 y.o. female presenting with a history of stones who see dr patel.  Pmh of breast cancer and rheumatoid arthtritis.  Last night began having right flank pain. Has been having a few days of nausea.  No fever or chills.  No difficulty voiding.  No hematuria  Pain much less this am.  No leukocytosis, lula or fever.     Past Medical History  She has a past medical history of Other conditions influencing health status.    Surgical History  She has a past surgical history that includes Colonoscopy (05/02/2013) and Breast lumpectomy (08/08/2016).     Social History  She has no history on file for tobacco use, alcohol use, and drug use.    Family History  No family history on file.     Allergies  Penicillins    Review of Systems     Physical Exam     Last Recorded Vitals  Blood pressure 144/83, pulse 74, temperature 36.5 °C (97.7 °F), resp. rate 20, height 1.676 m (5' 6\"), weight 90.7 kg (200 lb), SpO2 94 %.    Relevant Results  Results for orders placed or performed during the hospital encounter of 02/03/24 (from the past 24 hour(s))   CBC and Auto Differential   Result Value Ref Range    WBC 7.8 4.4 - 11.3 x10*3/uL    nRBC 0.0 0.0 - 0.0 /100 WBCs    RBC 4.20 4.00 - 5.20 x10*6/uL    Hemoglobin 13.3 12.0 - 16.0 g/dL    Hematocrit 38.3 36.0 - 46.0 %    MCV 91 80 - 100 fL    MCH 31.7 26.0 - 34.0 pg    MCHC 34.7 32.0 - 36.0 g/dL    RDW 11.9 11.5 - 14.5 %    Platelets 293 150 - 450 x10*3/uL    Neutrophils % 54.5 40.0 - 80.0 %    Immature Granulocytes %, Automated 0.3 0.0 - 0.9 %    Lymphocytes % 37.0 13.0 - 44.0 %    Monocytes % 5.5 2.0 - 10.0 %    Eosinophils % 2.1 0.0 - 6.0 %    Basophils % 0.6 0.0 - 2.0 %    Neutrophils Absolute 4.23 1.20 - 7.70 x10*3/uL    Immature Granulocytes Absolute, Automated 0.02 0.00 - 0.70 x10*3/uL    Lymphocytes Absolute 2.87 1.20 - 4.80 x10*3/uL    Monocytes Absolute 0.43 0.10 - 1.00 x10*3/uL    " Eosinophils Absolute 0.16 0.00 - 0.70 x10*3/uL    Basophils Absolute 0.05 0.00 - 0.10 x10*3/uL   Comprehensive metabolic panel   Result Value Ref Range    Glucose 115 (H) 74 - 99 mg/dL    Sodium 138 136 - 145 mmol/L    Potassium 3.6 3.5 - 5.3 mmol/L    Chloride 105 98 - 107 mmol/L    Bicarbonate 20 (L) 21 - 32 mmol/L    Anion Gap 17 10 - 20 mmol/L    Urea Nitrogen 16 6 - 23 mg/dL    Creatinine 0.69 0.50 - 1.05 mg/dL    eGFR >90 >60 mL/min/1.73m*2    Calcium 9.4 8.6 - 10.3 mg/dL    Albumin 4.4 3.4 - 5.0 g/dL    Alkaline Phosphatase 89 33 - 136 U/L    Total Protein 7.3 6.4 - 8.2 g/dL    AST 19 9 - 39 U/L    Bilirubin, Total 0.4 0.0 - 1.2 mg/dL    ALT 28 7 - 45 U/L   Lipase   Result Value Ref Range    Lipase 44 9 - 82 U/L   Urinalysis with Reflex Microscopic   Result Value Ref Range    Color, Urine Straw Straw, Yellow    Appearance, Urine Hazy (N) Clear    Specific Gravity, Urine 1.014 1.005 - 1.035    pH, Urine 7.0 5.0, 5.5, 6.0, 6.5, 7.0, 7.5, 8.0    Protein, Urine NEGATIVE NEGATIVE mg/dL    Glucose, Urine NEGATIVE NEGATIVE mg/dL    Blood, Urine LARGE (3+) (A) NEGATIVE    Ketones, Urine NEGATIVE NEGATIVE mg/dL    Bilirubin, Urine NEGATIVE NEGATIVE    Urobilinogen, Urine <2.0 <2.0 mg/dL    Nitrite, Urine NEGATIVE NEGATIVE    Leukocyte Esterase, Urine SMALL (1+) (A) NEGATIVE   Microscopic Only, Urine   Result Value Ref Range    WBC, Urine 1-5 1-5, NONE /HPF    RBC, Urine >20 (A) NONE, 1-2, 3-5 /HPF    Squamous Epithelial Cells, Urine 1-9 (SPARSE) Reference range not established. /HPF    Mucus, Urine 1+ Reference range not established. /LPF      CT abdomen pelvis w IV contrast    Result Date: 2/3/2024  Interpreted By:  Tania Sutherland, STUDY: CT ABDOMEN PELVIS W IV CONTRAST;  2/3/2024 4:42 am   INDICATION: Signs/Symptoms:right groin pain.   COMPARISON: CT abdomen and pelvis 10/05/2022   ACCESSION NUMBER(S): [id number]   ORDERING CLINICIAN: ELFEGO GOMES   TECHNIQUE: Axial CT of the abdomen and pelvis was  performed. Coronal and sagittal reconstructions were performed.   Intravenous contrast material was administered without immediate complication.   FINDINGS: LOWER CHEST: No consolidation or pleural effusion. Surgical clips are noted at the left breast.   ABDOMEN:   LIVER: The liver is enlarged measuring 20.4 cm in craniocaudal dimension and demonstrates diffusely decreased attenuation suggesting steatosis.   BILE DUCTS: No significant dilation.   GALLBLADDER: There is cholelithiasis.   PANCREAS: No peripancreatic inflammatory changes.   SPLEEN: Unremarkable.   ADRENAL GLANDS: Unremarkable.   KIDNEYS AND URETERS: There is decreased enhancement of the right kidney. There is moderate right hydronephrosis and dilation of the right ureter. There is a 5 mm calculus at the distal right ureter in the pelvis. No left hydronephrosis or hydroureter. There are bilateral renal calculi measuring up to 5 mm. Subcentimeter hypodensities at bilateral kidneys are too small to be adequately characterized.   PELVIS:   BLADDER: The urinary bladder is decompressed. There is mild soft tissue stranding at the urinary bladder.   REPRODUCTIVE ORGANS: The uterus is present.   BOWEL: No bowel obstruction. There is colonic diverticulosis with no CT evidence for acute diverticulitis. There is submucosal fat deposition at the terminal ileum, ascending colon and transverse colon. The appendix is surgically absent.   VESSELS: Mild atherosclerotic calcifications are noted at the abdominal aorta and its branches. No abdominal aortic aneurysm.   PERITONEUM/RETROPERITONEUM/LYMPH NODES: No free fluid or free air.  No retroperitoneal hemorrhage. No pathologically enlarged lymph nodes are identified.   BONES AND ABDOMINAL WALL: Multilevel degenerative changes are noted in the spine. There is a small fat containing umbilical hernia. There is a small fat containing right inguinal hernia.       1.  Right-sided obstructive uropathy with moderate right  hydronephrosis and dilation of the right ureter and with a 5 mm calculus at the distal right ureter . 2. Decreased enhancement of the right kidney, concerning for compromised renal function. 3. Bilateral nephrolithiasis. 4. Mild soft tissue stranding at the urinary bladder. Please correlate with urinalysis to exclude superimposed cystitis. 5. Colonic diverticulosis. 6.  Submucosal fat at the terminal ileum and at the ascending colon and transverse colon, nonspecific, may represent normal variant versus chronic changes of inflammatory bowel disease. 7. Hepatomegaly. Fatty infiltration of the liver. 8. Cholelithiasis.     MACRO: None.   Signed by: Tania Sutherland 2/3/2024 5:08 AM Dictation workstation:   [id number]  Scheduled medications  cefTRIAXone, 1 g, intravenous, q24h  heparin (porcine), 5,000 Units, subcutaneous, q8h  tamsulosin, 0.4 mg, oral, Daily      Continuous medications  lactated Ringer's, 100 mL/hr, Last Rate: 100 mL/hr (02/03/24 0800)      PRN medications  PRN medications: acetaminophen, HYDROmorphone, ondansetron        reviewed     Assessment/Plan     5 mm obstructing right flank distal ureteral stone.  Discussed cysto/stent possible ureteroscopy vs continued medical expulsive therapy.  She would like to continue medical exp therapy  May eat npo after midnight  Will reassess in am.      I spent 20 minutes in the professional and overall care of this patient.

## 2024-02-03 NOTE — ED PROVIDER NOTES
EMERGENCY DEPARTMENT ENCOUNTER      Pt Name: Donna Nair  MRN: [id number]  Birthdate 1965  Date of evaluation: 2/3/2024  Provider: Sulaiman Machuca DO    CHIEF COMPLAINT       Chief Complaint   Patient presents with    Flank Pain     Pt comes into ed via private auto. Pt states right flank pain started 2200 last night. Radiates into groin. Pt has hx of kidney stones       HISTORY OF PRESENT ILLNESS    Donna Nair is a 58 y.o. female who presents to the emergency department with Significant other for right groin pain.  Patient states this feels very typical of her previous kidney stone episodes.  She does note that she has known kidney stones and does follow with urology.  She tried taking NSAIDs at home with little relief therefore she is presenting to the emergency department for further evaluation.  She states when the pain initially started she was having occasional coughing fits the coughing has resolved and she noted that the pain never resolved.  Has history of abdominal surgeries including appendectomy.  Describes the pain as coming in waves.  Denies any urinary symptoms no fevers or chills.  No further associated symptoms at this time.          Nursing Notes were reviewed.    REVIEW OF SYSTEMS     CONSTITUTIONAL: Denies fever, sweats, chills.   NEURO: Denies difficulty walking, numbness, weakness, tingling, headache.   HEENT: Denies sore throat, rhinorrhea, changes in vision.   CARDIO: Denies chest pain, palpitations.  PULM: Denies shortness of breath, cough.   GI: Endorses groin pain.  Denies nausea, vomiting, diarrhea, constipation, melena, hematochezia.  : Denies painful urination, frequency, hematuria.   MSK: Denies recent trauma.   SKIN: Denies rash, lesions.   ENDOCRINE: Denies unexpected weight-loss.   HEME: Denies bleeding disorder.     PAST MEDICAL HISTORY     Past Medical History:   Diagnosis Date    Other conditions influencing health status     Bone Density Studies Dual-Energy X-ray  Absorptiometry       SURGICAL HISTORY       Past Surgical History:   Procedure Laterality Date    BREAST LUMPECTOMY  08/08/2016    Left Breast Lumpectomy    COLONOSCOPY  05/02/2013    Complete Colonoscopy       ALLERGIES     Penicillins    FAMILY HISTORY     No family history on file.     SOCIAL HISTORY       Social History     Socioeconomic History    Marital status:      Spouse name: Not on file    Number of children: Not on file    Years of education: Not on file    Highest education level: Not on file   Occupational History    Not on file   Tobacco Use    Smoking status: Not on file    Smokeless tobacco: Not on file   Substance and Sexual Activity    Alcohol use: Not on file    Drug use: Not on file    Sexual activity: Not on file   Other Topics Concern    Not on file   Social History Narrative    Not on file     Social Determinants of Health     Financial Resource Strain: Not on file   Food Insecurity: Not on file   Transportation Needs: Not on file   Physical Activity: Not on file   Stress: Not on file   Social Connections: Not on file   Intimate Partner Violence: Not on file   Housing Stability: Not on file       PHYSICAL EXAM   VS: As documented in the triage note from today's date and EMR flowsheet were reviewed.  Gen: Well developed. No acute distress. Seated in bed. Appears nontoxic.   Skin: Warm. Dry. Intact. No rashes or lesions.  Eyes: Pupils equally round and reactive to light. Clear sclera.   HENT: Atraumatic appearance. Mucosal membranes moist. No oral lesions, uvula midline, airway patent.   CV: Regular rate and regular rhythm. S1, S2. No pedal edema. Warm extremities.  Resp: Nonlabored breathing Clear to auscultation bilaterally. No increased work of breathing.   GI: Soft and nontender. No rebound or guarding. Bowel sounds x4 present.  No reproducible abdominal tenderness Dang sign McBurney's point tenderness is negative no CVA tenderness.  MSK: Symmetric muscle bulk. No joint swelling in  the extremities. Compartments are soft. Neurovascularly intact x4 extremities. Radial pulses +2 equal bilaterally.  Pedal pulses +2 equal bilaterally.  Neuro: Alert. Speech fluent. Moving all extremities. No focal deficits. Gait normal.  Psych: Appropriate. Kempt.    DIAGNOSTIC RESULTS   RADIOLOGY:   Non-plain film images such as CT, Ultrasound and MRI are read by the radiologist. Plain radiographic images are visualized and preliminarily interpreted by the emergency physician with the below findings:      Interpretation per the Radiologist below, if available at the time of this note:    CT abdomen pelvis w IV contrast   Final Result   1.  Right-sided obstructive uropathy with moderate right   hydronephrosis and dilation of the right ureter and with a 5 mm   calculus at the distal right ureter .   2. Decreased enhancement of the right kidney, concerning for   compromised renal function.   3. Bilateral nephrolithiasis.   4. Mild soft tissue stranding at the urinary bladder. Please   correlate with urinalysis to exclude superimposed cystitis.   5. Colonic diverticulosis.   6.  Submucosal fat at the terminal ileum and at the ascending colon   and transverse colon, nonspecific, may represent normal variant   versus chronic changes of inflammatory bowel disease.   7. Hepatomegaly. Fatty infiltration of the liver.   8. Cholelithiasis.             MACRO:   None.        Signed by: Tania Sutherland 2/3/2024 5:08 AM   Dictation workstation:   [id number]            ED BEDSIDE ULTRASOUND:   Performed by ED Physician - none    LABS:  Labs Reviewed   COMPREHENSIVE METABOLIC PANEL - Abnormal       Result Value    Glucose 115 (*)     Sodium 138      Potassium 3.6      Chloride 105      Bicarbonate 20 (*)     Anion Gap 17      Urea Nitrogen 16      Creatinine 0.69      eGFR >90      Calcium 9.4      Albumin 4.4      Alkaline Phosphatase 89      Total Protein 7.3      AST 19      Bilirubin, Total 0.4      ALT 28     URINALYSIS WITH  "REFLEX MICROSCOPIC - Abnormal    Color, Urine Straw      Appearance, Urine Hazy (*)     Specific Gravity, Urine 1.014      pH, Urine 7.0      Protein, Urine NEGATIVE      Glucose, Urine NEGATIVE      Blood, Urine LARGE (3+) (*)     Ketones, Urine NEGATIVE      Bilirubin, Urine NEGATIVE      Urobilinogen, Urine <2.0      Nitrite, Urine NEGATIVE      Leukocyte Esterase, Urine SMALL (1+) (*)    MICROSCOPIC ONLY, URINE - Abnormal    WBC, Urine 1-5      RBC, Urine >20 (*)     Squamous Epithelial Cells, Urine 1-9 (SPARSE)      Mucus, Urine 1+     LIPASE - Normal    Lipase 44      Narrative:     Venipuncture immediately after or during the administration of Metamizole may lead to falsely low results. Testing should be performed immediately prior to Metamizole dosing.   CBC WITH AUTO DIFFERENTIAL    WBC 7.8      nRBC 0.0      RBC 4.20      Hemoglobin 13.3      Hematocrit 38.3      MCV 91      MCH 31.7      MCHC 34.7      RDW 11.9      Platelets 293      Neutrophils % 54.5      Immature Granulocytes %, Automated 0.3      Lymphocytes % 37.0      Monocytes % 5.5      Eosinophils % 2.1      Basophils % 0.6      Neutrophils Absolute 4.23      Immature Granulocytes Absolute, Automated 0.02      Lymphocytes Absolute 2.87      Monocytes Absolute 0.43      Eosinophils Absolute 0.16      Basophils Absolute 0.05         All other labs were within normal range or not returned as of this dictation.    EMERGENCY DEPARTMENT COURSE/MDM:   Vitals:    Vitals:    02/03/24 0310   BP: (!) 198/90   Pulse: 81   Resp: 20   Temp: 36.5 °C (97.7 °F)   SpO2: 98%   Weight: 90.7 kg (200 lb)   Height: 1.7 m (5' 6.93\")       I reviewed the patient's triage vitals and they are hypertensive recommended follow-up with primary physician for repeat checks.    Due to the above findings the following was ordered morphine for pain fluid bolus Zofran for any refractory nausea.  Basic labs and urinalysis.    Lab work relatively benign no electrolyte derangements " renal function is appropriate no signs of anemia.  No leukocytosis making infectious etiology less likely.  Urinalysis is not consistent with UTI.  CT imaging does show obstructing right-sided 5 mm renal stone with enhancement changes to the kidney.  I did discuss these findings with urology Leonidas De La Torre who is agreed to see the patient on consult.  Patient will need to be admitted for symptomatic control for the time being.  She is required Toradol as well as narcotics still persistently having pain.  She is appreciative of care agreeable with plan.  EKG shows no acute injury pattern doubt atypical ACS.  Suspect source of pain is related to the obstructing kidney stone.  Discussed findings with the admitting hospitalist they have agreed to accept the patient for further treatment and evaluation.  House NP updated.    Multiple incidental findings on CT imaging patient was recommended close follow-up with primary physician.    ED Course as of 02/03/24 0547   Sat Feb 03, 2024 0411 Interpreted by the Emergency Department Attending: ECG revealed normal sinus rhythm at a rate of 76 beats per minute with FL interval 148 , QRS of 78 , QTc of 479.  No acute injury pattern.  No previous EKG to compare. [MG]      ED Course User Index  [MG] Sulaiman Machuca DO         Diagnoses as of 02/03/24 0547   Flank pain   Kidney stone       Patient was counseled regarding labs, imaging, likely diagnosis, and plan. All questions were answered.     ------------------------------------------------------------------  Information provided by the patient and spouse  Consults hospitalist, urology  Past medical history complicating workup history of kidney stones  Previous medical records reviewed previous office visit 11/29/2023.  Considered discharge home although patient requires additional symptomatic control.  ------------------------------------------------------------------  ED Medications administered this visit:    Medications    ondansetron (Zofran) injection 4 mg (4 mg intravenous Given 2/3/24 3487)   lactated Ringer's bolus 1,000 mL (1,000 mL intravenous New Bag 2/3/24 7647)   ketorolac (Toradol) injection 15 mg (15 mg intravenous Given 2/3/24 2694)   HYDROmorphone (Dilaudid) injection 0.5 mg (0.5 mg intravenous Given 2/3/24 9311)   iohexol (OMNIPaque) 350 mg iodine/mL solution 75 mL (75 mL intravenous Given 2/3/24 4033)     Final Impression:   1. Flank pain    2. Kidney stone          Sulaiman Machuca DO    (Please note that portions of this note were completed with a voice recognition program.  Efforts were made to edit the dictations but occasionally words are mis-transcribed.)     Sulaiman Machuca DO  02/03/24 0549       Sulaiman Machuca DO  02/03/24 0550

## 2024-02-03 NOTE — H&P
History Of Present Illness  Donna Nair is a 58 y.o. female with past medical history of kidney stones, asthma, rheumatoid arthritis, breast cancer, who presented to [facility] ED today with right flank pain that started today.  Patient states this feels very typical of her previous kidney stone episodes that required lithotripsy.  She does note that she has known kidney stones and does follow with urology.  She tried taking NSAIDs at home with little relief therefore she is presenting to the emergency department for further evaluation.  She states when the pain initially started she was having occasional coughing fits, the coughing has resolved and she noted that the pain never resolved. States she had nausea and an episode of vomiting. Denies dysuria, but has frequency. Has history of abdominal surgeries including appendectomy.  Describes the pain as coming in waves.  Denies any urinary symptoms, fevers, or chills.  Denies chest pain, shortness of breath, diarrhea, or constipation.      ED course: Hemodynamically stable. Afebrile. /90, HR 81, RR 20, 98% RA. EKG unavailable for my review. Labs overall unremarkable. Glucose 115. Bicarb 20. Lipase 44. Urine culture collected.  See imaging results below. Dilaudid, toradol, Zofran, and IVF given in ED. Pt is being admitted to Dr. Boyle who will continue to follow. I was asked to do the H&P and initial assessment.     Past Medical History  Past Medical History:   Diagnosis Date    Other conditions influencing health status     Bone Density Studies Dual-Energy X-ray Absorptiometry       Surgical History  Past Surgical History:   Procedure Laterality Date    BREAST LUMPECTOMY  08/08/2016    Left Breast Lumpectomy    COLONOSCOPY  05/02/2013    Complete Colonoscopy        Social History  She has no history on file for tobacco use, alcohol use, and drug use.    Family History  No family history on file.     Allergies  Penicillins    Review of Systems   10 point review  "of symptoms was performed and is negative except for what is stated in the HPI above.  Physical Exam  Constitutional:       Appearance: Normal appearance. She is obese.   HENT:      Head: Normocephalic and atraumatic.      Nose: Nose normal.      Mouth/Throat:      Mouth: Mucous membranes are moist.      Pharynx: Oropharynx is clear.   Eyes:      Extraocular Movements: Extraocular movements intact.      Conjunctiva/sclera: Conjunctivae normal.      Pupils: Pupils are equal, round, and reactive to light.   Cardiovascular:      Rate and Rhythm: Normal rate and regular rhythm.      Pulses: Normal pulses.      Heart sounds: Normal heart sounds.   Pulmonary:      Effort: Pulmonary effort is normal.      Breath sounds: Normal breath sounds.   Abdominal:      General: Abdomen is flat. Bowel sounds are normal.      Palpations: Abdomen is soft.   Musculoskeletal:         General: Normal range of motion.      Cervical back: Normal range of motion and neck supple.   Skin:     General: Skin is warm and dry.      Capillary Refill: Capillary refill takes less than 2 seconds.   Neurological:      General: No focal deficit present.      Mental Status: She is alert and oriented to person, place, and time.   Psychiatric:         Mood and Affect: Mood normal.         Behavior: Behavior normal.         Thought Content: Thought content normal.         Judgment: Judgment normal.          Last Recorded Vitals  Blood pressure (!) 198/90, pulse 81, temperature 36.5 °C (97.7 °F), resp. rate 20, height 1.7 m (5' 6.93\"), weight 90.7 kg (200 lb), SpO2 98 %.    Relevant Results  Results for orders placed or performed during the hospital encounter of 02/03/24 (from the past 24 hour(s))   CBC and Auto Differential   Result Value Ref Range    WBC 7.8 4.4 - 11.3 x10*3/uL    nRBC 0.0 0.0 - 0.0 /100 WBCs    RBC 4.20 4.00 - 5.20 x10*6/uL    Hemoglobin 13.3 12.0 - 16.0 g/dL    Hematocrit 38.3 36.0 - 46.0 %    MCV 91 80 - 100 fL    MCH 31.7 26.0 - 34.0 " pg    MCHC 34.7 32.0 - 36.0 g/dL    RDW 11.9 11.5 - 14.5 %    Platelets 293 150 - 450 x10*3/uL    Neutrophils % 54.5 40.0 - 80.0 %    Immature Granulocytes %, Automated 0.3 0.0 - 0.9 %    Lymphocytes % 37.0 13.0 - 44.0 %    Monocytes % 5.5 2.0 - 10.0 %    Eosinophils % 2.1 0.0 - 6.0 %    Basophils % 0.6 0.0 - 2.0 %    Neutrophils Absolute 4.23 1.20 - 7.70 x10*3/uL    Immature Granulocytes Absolute, Automated 0.02 0.00 - 0.70 x10*3/uL    Lymphocytes Absolute 2.87 1.20 - 4.80 x10*3/uL    Monocytes Absolute 0.43 0.10 - 1.00 x10*3/uL    Eosinophils Absolute 0.16 0.00 - 0.70 x10*3/uL    Basophils Absolute 0.05 0.00 - 0.10 x10*3/uL   Comprehensive metabolic panel   Result Value Ref Range    Glucose 115 (H) 74 - 99 mg/dL    Sodium 138 136 - 145 mmol/L    Potassium 3.6 3.5 - 5.3 mmol/L    Chloride 105 98 - 107 mmol/L    Bicarbonate 20 (L) 21 - 32 mmol/L    Anion Gap 17 10 - 20 mmol/L    Urea Nitrogen 16 6 - 23 mg/dL    Creatinine 0.69 0.50 - 1.05 mg/dL    eGFR >90 >60 mL/min/1.73m*2    Calcium 9.4 8.6 - 10.3 mg/dL    Albumin 4.4 3.4 - 5.0 g/dL    Alkaline Phosphatase 89 33 - 136 U/L    Total Protein 7.3 6.4 - 8.2 g/dL    AST 19 9 - 39 U/L    Bilirubin, Total 0.4 0.0 - 1.2 mg/dL    ALT 28 7 - 45 U/L   Lipase   Result Value Ref Range    Lipase 44 9 - 82 U/L   Urinalysis with Reflex Microscopic   Result Value Ref Range    Color, Urine Straw Straw, Yellow    Appearance, Urine Hazy (N) Clear    Specific Gravity, Urine 1.014 1.005 - 1.035    pH, Urine 7.0 5.0, 5.5, 6.0, 6.5, 7.0, 7.5, 8.0    Protein, Urine NEGATIVE NEGATIVE mg/dL    Glucose, Urine NEGATIVE NEGATIVE mg/dL    Blood, Urine LARGE (3+) (A) NEGATIVE    Ketones, Urine NEGATIVE NEGATIVE mg/dL    Bilirubin, Urine NEGATIVE NEGATIVE    Urobilinogen, Urine <2.0 <2.0 mg/dL    Nitrite, Urine NEGATIVE NEGATIVE    Leukocyte Esterase, Urine SMALL (1+) (A) NEGATIVE   Microscopic Only, Urine   Result Value Ref Range    WBC, Urine 1-5 1-5, NONE /HPF    RBC, Urine >20 (A) NONE, 1-2,  3-5 /HPF    Squamous Epithelial Cells, Urine 1-9 (SPARSE) Reference range not established. /HPF    Mucus, Urine 1+ Reference range not established. /LPF     CT abdomen pelvis w IV contrast    Result Date: 2/3/2024  Interpreted By:  Tania Sutherland, STUDY: CT ABDOMEN PELVIS W IV CONTRAST;  2/3/2024 4:42 am   INDICATION: Signs/Symptoms:right groin pain.   COMPARISON: CT abdomen and pelvis 10/05/2022   ACCESSION NUMBER(S): [id number]   ORDERING CLINICIAN: ELFEGO GOMES   TECHNIQUE: Axial CT of the abdomen and pelvis was performed. Coronal and sagittal reconstructions were performed.   Intravenous contrast material was administered without immediate complication.   FINDINGS: LOWER CHEST: No consolidation or pleural effusion. Surgical clips are noted at the left breast.   ABDOMEN:   LIVER: The liver is enlarged measuring 20.4 cm in craniocaudal dimension and demonstrates diffusely decreased attenuation suggesting steatosis.   BILE DUCTS: No significant dilation.   GALLBLADDER: There is cholelithiasis.   PANCREAS: No peripancreatic inflammatory changes.   SPLEEN: Unremarkable.   ADRENAL GLANDS: Unremarkable.   KIDNEYS AND URETERS: There is decreased enhancement of the right kidney. There is moderate right hydronephrosis and dilation of the right ureter. There is a 5 mm calculus at the distal right ureter in the pelvis. No left hydronephrosis or hydroureter. There are bilateral renal calculi measuring up to 5 mm. Subcentimeter hypodensities at bilateral kidneys are too small to be adequately characterized.   PELVIS:   BLADDER: The urinary bladder is decompressed. There is mild soft tissue stranding at the urinary bladder.   REPRODUCTIVE ORGANS: The uterus is present.   BOWEL: No bowel obstruction. There is colonic diverticulosis with no CT evidence for acute diverticulitis. There is submucosal fat deposition at the terminal ileum, ascending colon and transverse colon. The appendix is surgically absent.   VESSELS:  Mild atherosclerotic calcifications are noted at the abdominal aorta and its branches. No abdominal aortic aneurysm.   PERITONEUM/RETROPERITONEUM/LYMPH NODES: No free fluid or free air.  No retroperitoneal hemorrhage. No pathologically enlarged lymph nodes are identified.   BONES AND ABDOMINAL WALL: Multilevel degenerative changes are noted in the spine. There is a small fat containing umbilical hernia. There is a small fat containing right inguinal hernia.       1.  Right-sided obstructive uropathy with moderate right hydronephrosis and dilation of the right ureter and with a 5 mm calculus at the distal right ureter . 2. Decreased enhancement of the right kidney, concerning for compromised renal function. 3. Bilateral nephrolithiasis. 4. Mild soft tissue stranding at the urinary bladder. Please correlate with urinalysis to exclude superimposed cystitis. 5. Colonic diverticulosis. 6.  Submucosal fat at the terminal ileum and at the ascending colon and transverse colon, nonspecific, may represent normal variant versus chronic changes of inflammatory bowel disease. 7. Hepatomegaly. Fatty infiltration of the liver. 8. Cholelithiasis.     MACRO: None.   Signed by: Tania Sutherland 2/3/2024 5:08 AM Dictation workstation:   [id number]        Assessment/Plan   Principal Problem:    Kidney stone    58 year old female with past medical history of kidney stones, asthma, rheumatoid arthritis, breast cancer, who presented to [facility] ED today with right flank pain that started today.  Patient states this feels very typical of her previous kidney stone episodes that required lithotripsy.  She does note that she has known kidney stones and does follow with urology.  She tried taking NSAIDs at home with little relief therefore she is presenting to the emergency department for further evaluation. Urology consulted. Start antibiotics for suspected UTI. Patient will be hospitalized for further medical management.    #5mm obstructive  kidney stone in right ureter with hydronephrosis  #Suspect UTI  #Right flank pain  Admit to OBS/RMF to Dr. Boyle  Urology consult and appreciate recs  Imaging results above  Zofran/pain medication prn   Ceftriaxone  Flomax   1L LR given in ED. Continue LR @ 100ml/hr x 1 liter (monitor for fluid overload)  NPO. Attending to resume as appropriate  Follow urine culture  Repeat labs & BNP in AM    Chronic Issues  #Asthma  #Rheumatoid arthritis  #Breast cancer  Continue home meds as appropriate when nursing completes home med rec.  Full code    #DVT PPX  Heparin SQ. Hold morning dose for potential procedure.  SCD's     I spent 35 minutes in the professional and overall care of this patient.      Nany Kline, APRN-CNP

## 2024-02-04 LAB — BACTERIA UR CULT: NORMAL

## 2024-02-07 LAB
ATRIAL RATE: 76 BPM
P AXIS: 52 DEGREES
P OFFSET: 206 MS
P ONSET: 153 MS
PR INTERVAL: 148 MS
Q ONSET: 227 MS
QRS COUNT: 13 BEATS
QRS DURATION: 78 MS
QT INTERVAL: 426 MS
QTC CALCULATION(BAZETT): 479 MS
QTC FREDERICIA: 460 MS
R AXIS: 29 DEGREES
T AXIS: 16 DEGREES
T OFFSET: 440 MS
VENTRICULAR RATE: 76 BPM

## 2025-06-17 ENCOUNTER — APPOINTMENT (OUTPATIENT)
Dept: RADIOLOGY | Facility: HOSPITAL | Age: 60
End: 2025-06-17
Payer: COMMERCIAL

## 2025-06-17 ENCOUNTER — HOSPITAL ENCOUNTER (EMERGENCY)
Facility: HOSPITAL | Age: 60
Discharge: HOME | End: 2025-06-17
Attending: EMERGENCY MEDICINE
Payer: COMMERCIAL

## 2025-06-17 VITALS
HEART RATE: 74 BPM | HEIGHT: 66 IN | BODY MASS INDEX: 29.41 KG/M2 | WEIGHT: 183 LBS | RESPIRATION RATE: 16 BRPM | OXYGEN SATURATION: 99 % | DIASTOLIC BLOOD PRESSURE: 89 MMHG | TEMPERATURE: 98.1 F | SYSTOLIC BLOOD PRESSURE: 178 MMHG

## 2025-06-17 DIAGNOSIS — N23 RENAL COLIC ON LEFT SIDE: Primary | ICD-10-CM

## 2025-06-17 LAB
ALBUMIN SERPL BCP-MCNC: 4.5 G/DL (ref 3.4–5)
ALP SERPL-CCNC: 98 U/L (ref 33–136)
ALT SERPL W P-5'-P-CCNC: 28 U/L (ref 7–45)
ANION GAP SERPL CALC-SCNC: 16 MMOL/L (ref 10–20)
APPEARANCE UR: ABNORMAL
AST SERPL W P-5'-P-CCNC: 16 U/L (ref 9–39)
BASOPHILS # BLD AUTO: 0.06 X10*3/UL (ref 0–0.1)
BASOPHILS NFR BLD AUTO: 0.7 %
BILIRUB SERPL-MCNC: 0.7 MG/DL (ref 0–1.2)
BILIRUB UR STRIP.AUTO-MCNC: NEGATIVE MG/DL
BUN SERPL-MCNC: 15 MG/DL (ref 6–23)
CALCIUM SERPL-MCNC: 9.9 MG/DL (ref 8.6–10.3)
CHLORIDE SERPL-SCNC: 105 MMOL/L (ref 98–107)
CO2 SERPL-SCNC: 22 MMOL/L (ref 21–32)
COLOR UR: ABNORMAL
CREAT SERPL-MCNC: 0.88 MG/DL (ref 0.5–1.05)
EGFRCR SERPLBLD CKD-EPI 2021: 75 ML/MIN/1.73M*2
EOSINOPHIL # BLD AUTO: 0.19 X10*3/UL (ref 0–0.7)
EOSINOPHIL NFR BLD AUTO: 2.3 %
ERYTHROCYTE [DISTWIDTH] IN BLOOD BY AUTOMATED COUNT: 11.8 % (ref 11.5–14.5)
GLUCOSE SERPL-MCNC: 103 MG/DL (ref 74–99)
GLUCOSE UR STRIP.AUTO-MCNC: NORMAL MG/DL
HCT VFR BLD AUTO: 40.3 % (ref 36–46)
HGB BLD-MCNC: 13.6 G/DL (ref 12–16)
HOLD SPECIMEN: 293
IMM GRANULOCYTES # BLD AUTO: 0.02 X10*3/UL (ref 0–0.7)
IMM GRANULOCYTES NFR BLD AUTO: 0.2 % (ref 0–0.9)
KETONES UR STRIP.AUTO-MCNC: NEGATIVE MG/DL
LACTATE SERPL-SCNC: 1.9 MMOL/L (ref 0.4–2)
LACTATE SERPL-SCNC: 2.3 MMOL/L (ref 0.4–2)
LEUKOCYTE ESTERASE UR QL STRIP.AUTO: ABNORMAL
LIPASE SERPL-CCNC: 54 U/L (ref 9–82)
LYMPHOCYTES # BLD AUTO: 4.16 X10*3/UL (ref 1.2–4.8)
LYMPHOCYTES NFR BLD AUTO: 49.8 %
MCH RBC QN AUTO: 31.2 PG (ref 26–34)
MCHC RBC AUTO-ENTMCNC: 33.7 G/DL (ref 32–36)
MCV RBC AUTO: 92 FL (ref 80–100)
MONOCYTES # BLD AUTO: 0.62 X10*3/UL (ref 0.1–1)
MONOCYTES NFR BLD AUTO: 7.4 %
MUCOUS THREADS #/AREA URNS AUTO: ABNORMAL /LPF
NEUTROPHILS # BLD AUTO: 3.3 X10*3/UL (ref 1.2–7.7)
NEUTROPHILS NFR BLD AUTO: 39.6 %
NITRITE UR QL STRIP.AUTO: NEGATIVE
NRBC BLD-RTO: 0 /100 WBCS (ref 0–0)
PH UR STRIP.AUTO: 8 [PH]
PLATELET # BLD AUTO: 257 X10*3/UL (ref 150–450)
POTASSIUM SERPL-SCNC: 3.7 MMOL/L (ref 3.5–5.3)
PROT SERPL-MCNC: 7.2 G/DL (ref 6.4–8.2)
PROT UR STRIP.AUTO-MCNC: ABNORMAL MG/DL
RBC # BLD AUTO: 4.36 X10*6/UL (ref 4–5.2)
RBC # UR STRIP.AUTO: ABNORMAL MG/DL
RBC #/AREA URNS AUTO: >20 /HPF
SODIUM SERPL-SCNC: 139 MMOL/L (ref 136–145)
SP GR UR STRIP.AUTO: 1.01
SQUAMOUS #/AREA URNS AUTO: ABNORMAL /HPF
UROBILINOGEN UR STRIP.AUTO-MCNC: NORMAL MG/DL
WBC # BLD AUTO: 8.4 X10*3/UL (ref 4.4–11.3)
WBC #/AREA URNS AUTO: ABNORMAL /HPF

## 2025-06-17 PROCEDURE — 2500000004 HC RX 250 GENERAL PHARMACY W/ HCPCS (ALT 636 FOR OP/ED): Performed by: EMERGENCY MEDICINE

## 2025-06-17 PROCEDURE — 74176 CT ABD & PELVIS W/O CONTRAST: CPT

## 2025-06-17 PROCEDURE — 85025 COMPLETE CBC W/AUTO DIFF WBC: CPT | Performed by: EMERGENCY MEDICINE

## 2025-06-17 PROCEDURE — 36415 COLL VENOUS BLD VENIPUNCTURE: CPT | Performed by: EMERGENCY MEDICINE

## 2025-06-17 PROCEDURE — 96361 HYDRATE IV INFUSION ADD-ON: CPT

## 2025-06-17 PROCEDURE — 74176 CT ABD & PELVIS W/O CONTRAST: CPT | Performed by: STUDENT IN AN ORGANIZED HEALTH CARE EDUCATION/TRAINING PROGRAM

## 2025-06-17 PROCEDURE — 83605 ASSAY OF LACTIC ACID: CPT | Performed by: EMERGENCY MEDICINE

## 2025-06-17 PROCEDURE — 96375 TX/PRO/DX INJ NEW DRUG ADDON: CPT

## 2025-06-17 PROCEDURE — 87086 URINE CULTURE/COLONY COUNT: CPT | Mod: PARLAB | Performed by: EMERGENCY MEDICINE

## 2025-06-17 PROCEDURE — 96365 THER/PROPH/DIAG IV INF INIT: CPT

## 2025-06-17 PROCEDURE — 80053 COMPREHEN METABOLIC PANEL: CPT | Performed by: EMERGENCY MEDICINE

## 2025-06-17 PROCEDURE — 99284 EMERGENCY DEPT VISIT MOD MDM: CPT | Mod: 25 | Performed by: EMERGENCY MEDICINE

## 2025-06-17 PROCEDURE — 83690 ASSAY OF LIPASE: CPT | Performed by: EMERGENCY MEDICINE

## 2025-06-17 PROCEDURE — 81001 URINALYSIS AUTO W/SCOPE: CPT | Performed by: EMERGENCY MEDICINE

## 2025-06-17 RX ORDER — ONDANSETRON 4 MG/1
8 TABLET, ORALLY DISINTEGRATING ORAL EVERY 8 HOURS PRN
Qty: 30 TABLET | Refills: 0 | Status: SHIPPED | OUTPATIENT
Start: 2025-06-17 | End: 2025-06-24

## 2025-06-17 RX ORDER — TAMSULOSIN HYDROCHLORIDE 0.4 MG/1
0.4 CAPSULE ORAL DAILY
Qty: 15 CAPSULE | Refills: 0 | Status: SHIPPED | OUTPATIENT
Start: 2025-06-17 | End: 2025-07-02

## 2025-06-17 RX ORDER — MORPHINE SULFATE 2 MG/ML
2 INJECTION, SOLUTION INTRAMUSCULAR; INTRAVENOUS ONCE
Status: COMPLETED | OUTPATIENT
Start: 2025-06-17 | End: 2025-06-17

## 2025-06-17 RX ORDER — OXYCODONE HYDROCHLORIDE 5 MG/1
5 TABLET ORAL EVERY 6 HOURS PRN
Qty: 15 TABLET | Refills: 0 | Status: SHIPPED | OUTPATIENT
Start: 2025-06-17 | End: 2025-06-20

## 2025-06-17 RX ORDER — CEFTRIAXONE 2 G/50ML
2 INJECTION, SOLUTION INTRAVENOUS ONCE
Status: COMPLETED | OUTPATIENT
Start: 2025-06-17 | End: 2025-06-17

## 2025-06-17 RX ORDER — ONDANSETRON HYDROCHLORIDE 2 MG/ML
4 INJECTION, SOLUTION INTRAVENOUS ONCE
Status: COMPLETED | OUTPATIENT
Start: 2025-06-17 | End: 2025-06-17

## 2025-06-17 RX ADMIN — MORPHINE SULFATE 2 MG: 2 INJECTION, SOLUTION INTRAMUSCULAR; INTRAVENOUS at 05:17

## 2025-06-17 RX ADMIN — SODIUM CHLORIDE, SODIUM LACTATE, POTASSIUM CHLORIDE, AND CALCIUM CHLORIDE 1000 ML: .6; .31; .03; .02 INJECTION, SOLUTION INTRAVENOUS at 05:16

## 2025-06-17 RX ADMIN — ONDANSETRON 4 MG: 2 INJECTION INTRAMUSCULAR; INTRAVENOUS at 05:17

## 2025-06-17 RX ADMIN — CEFTRIAXONE SODIUM 2 G: 2 INJECTION, SOLUTION INTRAVENOUS at 07:59

## 2025-06-17 ASSESSMENT — PAIN - FUNCTIONAL ASSESSMENT: PAIN_FUNCTIONAL_ASSESSMENT: 0-10

## 2025-06-17 ASSESSMENT — LIFESTYLE VARIABLES
EVER HAD A DRINK FIRST THING IN THE MORNING TO STEADY YOUR NERVES TO GET RID OF A HANGOVER: NO
EVER FELT BAD OR GUILTY ABOUT YOUR DRINKING: NO
HAVE YOU EVER FELT YOU SHOULD CUT DOWN ON YOUR DRINKING: NO
HAVE PEOPLE ANNOYED YOU BY CRITICIZING YOUR DRINKING: NO
TOTAL SCORE: 0

## 2025-06-17 ASSESSMENT — PAIN DESCRIPTION - ORIENTATION: ORIENTATION: LEFT

## 2025-06-17 ASSESSMENT — PAIN DESCRIPTION - FREQUENCY: FREQUENCY: CONSTANT/CONTINUOUS

## 2025-06-17 ASSESSMENT — PAIN SCALES - GENERAL
PAINLEVEL_OUTOF10: 3
PAINLEVEL_OUTOF10: 9

## 2025-06-17 ASSESSMENT — PAIN DESCRIPTION - PAIN TYPE: TYPE: ACUTE PAIN

## 2025-06-17 ASSESSMENT — PAIN DESCRIPTION - DESCRIPTORS: DESCRIPTORS: SHARP

## 2025-06-17 ASSESSMENT — PAIN DESCRIPTION - LOCATION: LOCATION: BACK

## 2025-06-17 NOTE — ED TRIAGE NOTES
58 Y/O FEMALE COMPLAINS OF LEFT FLANK PAIN THAT BEGAN TONIGHT. STATES TOOK TRAMADOL WITHOUT RELIEF. PATIENT  PRESENTS WITH DRY HEAVES.

## 2025-06-17 NOTE — DISCHARGE INSTRUCTIONS
Please your urologist or you can follow-up with Dr. Keyon Vee  If your pain becomes unbearable, if you develop fever or chills or any new symptoms it is important that you return  Follow-up with your primary care for

## 2025-06-17 NOTE — ED PROVIDER NOTES
Emergency Department Provider Note       History of Present Illness     History provided by: Patient  Limitations to History: None  External Records Reviewed with Brief Summary: None    HPI:  Donna Nair is a 59 y.o. female hx kidney stones s/p lithotripsy ~2yr ago with  urology now presents with new L flank pain - reports sudden onset L flank pain this afternoon.  Pain associated with n/v and dry heaves but no hematuria, dysuria, urinary frequency or fever.  Pt took PO ketorolac she had leftover from last stone episode with initial relief, but pain then recurred ~8hr later at 2am.  It woke her from sleep and she was unable to get comfortable, even with an additional dose of ketorolac ~4am, which prompted ED visit.    Physical Exam   Triage vitals:  T 36.8 °C (98.2 °F)  HR 86  BP (!) 203/112  RR 18  O2 99 % None (Room air)    General: Awake, alert, mildly uncomfortable  Eyes: Gaze conjugate.  No scleral icterus or injection  HENT: Normo-cephalic, atraumatic. No stridor  CV: Regular rate, regular rhythm  Resp: Breathing non-labored, speaking in full sentences.  Clear to auscultation bilaterally  GI: Soft, non-distended, non-tender. No rebound or guarding, +L CVA tenderness, no R CVA tenderness  MSK/Extremities: No gross bony deformities. Moving all extremities  Skin: Warm. Appropriate color  Neuro: Alert. Oriented. Face symmetric. Speech is fluent.  Gross strength and sensation intact in b/l UE and LEs  Psych: Appropriate mood and affect      Medical Decision Making & ED Course   Medical Decision Makin y.o. female ***  ----  {Scoring Tools Utilized:82091}    Differential diagnoses considered include but are not limited to: ***    Social Determinants of Health which Significantly Impact Care: {Social Determinants of Health which Significantly Impact Care:02929} {The following actions were taken to address these social determinants (Optional):74241}    EKG Independent Interpretation: {EKG Independent  "Interpretation:12587}    Independent Result Review and Interpretation: {Independent Result Review and Interpretation:13167::\"Relevant laboratory and radiographic results were reviewed and independently interpreted by myself.  As necessary, they are commented on in the ED Course.\"}    Chronic conditions affecting the patient's care: {Chronic conditions affecting the patient's care:63880::\"As documented above in MDM\"}    The patient was discussed with the following consultants/services: {The patient was discussed with the following consultants/services:45947}    Care Considerations: {Care Considerations:19215::\"As documented above in MDM\"}    ED Course:       Disposition   {ED Disposition:50066}    Procedures   Procedures    {ED Provider Level (Optional):06847}    Elyse H Klerman, MD  Emergency Medicine                                                    "   Procedures        Elyse H Klerman, MD  Emergency Medicine                                                       Elyse H Klerman, MD  06/26/25 [id number]

## 2025-06-19 LAB — BACTERIA UR CULT: ABNORMAL
